# Patient Record
(demographics unavailable — no encounter records)

---

## 2024-10-10 NOTE — HISTORY OF PRESENT ILLNESS
[FreeTextEntry1] : spots [de-identified] : Ms. MADISON HUA is a 59 year old F here for evaluation of below   #Itchy growths on R hairline and R cheek #Interested in botox, had it in May on forehead thinks it caused droopy eyelid #FBSE.  Spots scattered on body x years. Asymptomatic and unchanged. No alleviating/aggravating factors. Never been treated.   Personal hx of skin cancer: no FHx of skin cancer: no Social Hx: not working Referred by: Dr. LUGO,REFERRED

## 2024-10-10 NOTE — ASSESSMENT
[FreeTextEntry1] : #Inflamed Seborrheic Keratosis x3, R face - Given inflammatory nature of lesions above, will treat with cryosurgery - Patient was verbally consented. Lesions treated with liquid nitrogen f/t/f x2 cycles. Patient tolerated it well. - Side effects include blister formation, hypopigmentation, and scarring - Wound care with vaseline  #Seborrheic keratoses #Solar lentigo #Screening exam for skin cancer - no suspicious lesions on exam today - TBSE performed today - Advised sun protection. Recommended OTC sunscreen products (EltaMD/Neutrogena/La Roche Posay), including SPF30+ with broadband UV protection as well as proper use. Discussed OTC sun protective clothing - Counseled patient to monitor for changes, including mole monitoring and self-skin exams  #Rhytides - DIscussed botox (including risk of heavy lids), fraxel, pixel upper lip. Pt to think about it

## 2024-10-28 NOTE — HISTORY OF PRESENT ILLNESS
[de-identified] : The patient presents for reevaluation of left shoulder tendinitis. She had stopped PT after the last visit. She had a cortisone injection 7/2/24 with good relief until last month. She would like to discuss further imaging.

## 2024-10-28 NOTE — DISCUSSION/SUMMARY
[de-identified] : The patient clinically has tendinitis of the left shoulder.  She did have some relief from physical therapy and a corticosteroid injection.  She did not resume physical therapy as recommended.  She has had an increase in pain.  I have suggested that she could resume physical therapy but she prefers to have further diagnostic testing.  She is referred for an MRI of her left shoulder.  She will return following the MRI.

## 2024-10-28 NOTE — PHYSICAL EXAM
[Rad] : radial 2+ and symmetric bilaterally [Normal] : Alert and in no acute distress [Poor Appearance] : well-appearing [Acute Distress] : not in acute distress [de-identified] : The patient has no respiratory distress. Mood and affect are normal. The patient is alert and oriented to person, place and time. Examination of the cervical spine demonstrates no tenderness, no deformity and no muscle spasm. Cervical spine rotation is 60 to the right, 60 to the left, 75 of extension and 45 of flexion. Neurologic exam of the upper extremities reveals intact sensation to light touch. Motor function is 5 over 5 in all groups. Deep tendon reflexes are 2+ and equal at the biceps, triceps and brachioradialis. Examination of the left shoulder demonstrates no deformity. The skin is intact. There is no erythema. There is tenderness anteriorly. Impingement sign is positive There is no instability. Drop arm test is negative. Empty can test is negative. Liftoff test is negative. Rumford test is negative. She has elevation of 125 degrees, external rotation of 40 and internal rotation to the middle lumbar level.  She has painful motion.  The elbows are stable.  There is no lymphedema.

## 2024-11-13 NOTE — HISTORY OF PRESENT ILLNESS
[FreeTextEntry1] : 59F no PMH, on no medications, here for evaluation of osteoporosis recently diagnosed, based on DEXA 9/2024, showing T score -3.8 in lumbar spin, -3.1 in the total hip. Takes daily calcium and vitamin D. Does daily walking 3-5 miles (weight bearing exercise) Did report early menopause, at around age 45.  No alcohol or tobacco use.  No hx of PPI use.  No weight loss in the last few years.  No known family history of hip fractures in parents, no known hx of osteoporosis.     [Weight Loss] : no weight loss [Arthralgias] : no arthralgias

## 2024-11-13 NOTE — ASSESSMENT
[FreeTextEntry1] : 59F no PMH, on no medications, here for evaluation of osteoporosis recently diagnosed, based on DEXA 9/2024, showing T score -3.8 in lumbar spin, -3.1 in the total hip.  I gave patient various options for osteoporosis treatment including oral versus IV bisphosphonates, versus subcutaneous Prolia injections, versus subcutaneous teriparatide versus Evenity injections. Possible side effects for all of the above medications were discussed with the patient.  She was advised that given her prior compression fracture history in the spine as well as T-score exceeding -3.0, she is at especially high risk for fracture and that teriparatide or Evenity would be most highly recommended.  Patient at this time would like to think about her options-I have given her information sheets on all the above medications.  She states she wants to do lifestyle measures first before starting medication but I advised her to start her on a prescription medication due to her degree of osteoporosis.  She will call the office and let me know her decision.  She was advised to take calcium and vitamin D daily with food.  She was advised to do weightbearing exercises if possible.  Check CMP, Vitamin D, TSH, PTH at this time.

## 2024-12-12 NOTE — CONSULT LETTER
[Dear  ___] : Dear  [unfilled], [Consult Letter:] : I had the pleasure of evaluating your patient, [unfilled]. [Please see my note below.] : Please see my note below. [Consult Closing:] : Thank you very much for allowing me to participate in the care of this patient.  If you have any questions, please do not hesitate to contact me. [Sincerely,] : Sincerely, [FreeTextEntry3] : Ramakrishna Peralta, III, MD

## 2024-12-12 NOTE — ADDENDUM
[FreeTextEntry1] : This note was written by Rosanna Moyer on 12/12/2024 acting as scribe for Ramakrishna Pearlta III, MD

## 2024-12-12 NOTE — PHYSICAL EXAM
[de-identified] : Right Hip: Hip: Range of Motion in Degrees: 	                                  Claimant:	Normal:	 Flexion (Active) 	                   120 	                120-degrees	 Flexion (Passive)	                   120	                120-degrees	 Extension (Active)	                   -30	                -30-degrees	 Extension (Passive)	   -30	                -30-degrees	 Abduction (Active)	                   45-50	                74-65-lpwnwyy	 Abduction (Passive)	   45-50	                30-72-lwplsor	 Adduction (Active)              	   20-30	                89-92-esutdme	 Adduction (Passive)	   20-30	                50-30-ynfgbcc	 Internal Rotation (Active) 	   35	                35-degrees	 Internal Rotation (Passive)	   35	                35-degrees	 External Rotation (Active)	   45	                45-degrees	 External Rotation (Passive)	   45	                45-degrees	  Tenderness with flexion, adduction and internal rotation.  No tenderness to axial load. Tenderness into the groin.  No tenderness to palpation over the greater trochanter.  Negative Trendelenburg.  No tenderness with resisted abduction.  No weakness to flexion, extension, abduction or adduction.  No evidence of instability.  No motor or sensory deficits.  2+ DP and PT pulses.  Skin is intact.  No scars, rashes or lesions.     Left Hip: Hip: Range of Motion in Degrees: 	                                 Claimant:	   Normal:	 Flexion (Active) 	                 120 	   120-degrees	 Flexion (Passive)	                 120	   120-degrees	 Extension (Active)	                 -30	   -30-degrees	 Extension (Passive)	 -30	   -30-degrees	 Abduction (Active)	                 45-50	   31-77-hcfzovx	 Abduction (Passive)	 45-50	   58-56-esouaoj	 Adduction (Active)  	 20-30	   58-96-vxqfxap	 Adduction (Passive)	 20-30	   64-36-vfxwhhx	 Internal Rotation (Active) 	 35	   35-degrees	 Internal Rotation (Passive)	 35	   35-degrees	 External Rotation (Active)	 45	   45-degrees	 External Rotation (Passive)	 45	   45-degrees	  No tenderness with internal or external rotation or axial load.  No tenderness to palpation over the greater trochanter.  Negative Trendelenburg.  No tenderness with resisted abduction.  No weakness to flexion, extension, abduction or adduction.  No evidence of instability.  No motor or sensory deficits.  2+ DP and PT pulses.  Skin is intact.  No scars, rashes or lesions.     [de-identified] : Gait and Station:  Ambulating with a slightly antalgic to antalgic gait.  Station:  Normal.  [de-identified] : Appearance:  Well-developed, well-nourished female in no acute distress.   [de-identified] : Radiographs, two to three views of the right hip with pelvis taken in the office today, show no obvious osseous abnormalities.

## 2024-12-12 NOTE — HISTORY OF PRESENT ILLNESS
[de-identified] : The patient comes in today with complaints of pain to her right hip.  She states she kind of tripped over her son's dog and landed on her hip and developed pain into the groin.  The patient states the onset/injury occurred 12/08/2024.  This injury is not work related or due to an automobile accident.  The patient states the pain is constant.  The patient describes the pain as sharp, achy, throbbing, shooting and stabbing. [6] : a current pain level of 6/10 [de-identified] : walking [de-identified] : rest, Tylenol

## 2024-12-12 NOTE — DISCUSSION/SUMMARY
[de-identified] : The patient presents with a probable traumatic labral tear of the right hip.  At this time, I recommend rest, ice, Medrol Dosepak and reassessment in two weeks.

## 2024-12-30 NOTE — HISTORY OF PRESENT ILLNESS
[___ Month(s) Ago] : [unfilled] month(s) ago [FreeTextEntry1] : 12/2024 followup: she is taking calcium and vitamin D daily. She is doing weight bearing exercises regularly. has R. hip pain, MRI recently done showing labrum tear and gluteus tendinosis- was given a script for PT but has not started that yet- oral steroid pack helped symptoms but then they recurred. no tobacco or alcohol use.

## 2024-12-30 NOTE — ASSESSMENT
[FreeTextEntry1] : 59F no PMH, on no medications, here for evaluation of osteoporosis recently diagnosed, based on DEXA 9/2024, showing T score -3.8 in lumbar spin, -3.1 in the total hip. She opted to start Prolia injections.   Patient was told she would be informed when Prolia injection is ready at our office (not available at this time). In the meantime, continue daily calcium and vitamin D supplementation and weight bearing exercises. She was advised that prolonged steroid use can contribute to bone thinning (she has only recently done a medrol pack).  Her next bloodwork should be completed within 1 month prior to her second prolia dose- no labs required at this time.

## 2025-01-09 NOTE — ASSESSMENT
[FreeTextEntry1] : 59F no PMH, on no medications, here for evaluation of osteoporosis recently diagnosed, based on DEXA 9/2024, showing T score -3.8 in lumbar spin, -3.1 in the total hip. She opted to start Prolia injections.   First Prolia injection was administered today to patient's left upper arm. She was advised to follow up in 6 months for repeat Prolia injection, and to have labs (CMP, Vitamin D, TSH) repeated the week prior to her visit.  In the meantime, continue daily calcium and vitamin D supplementation and weight bearing exercises. Next DEXA is due in 9/2026.

## 2025-01-09 NOTE — HISTORY OF PRESENT ILLNESS
[___ Week(s) Ago] : [unfilled] week(s) ago [FreeTextEntry1] : 1/2025: here for prolia injection. : she is taking calcium and vitamin D daily. She is doing weight bearing exercises regularly.

## 2025-01-09 NOTE — PROCEDURE
[Today's Date:] : Date: [unfilled] [Soft Tissue Injection] : soft tissue injection was performed [Risks] : risks [Benefits] : benefits [Consent Obtained] : written consent was obtained prior to the procedure and is detailed in the patient's record [Patient] : Prior to the start of the procedure a time out was taken and the identity of the patient was confirmed via name and date of birth with the patient. The correct site and the procedure to be performed were confirmed. The correct side was confirmed if applicable. The availability of the correct equipment was verified [Therapeutic] : therapeutic [#1 Site: ______] : #1 site identified in the [unfilled] [Alcohol] : alcohol [25 gauge 5/8  inch] : A 25 gauge 5/8 inch needle was used [Tolerated Well] : the patient tolerated the procedure well [No Complications] : there were no complications [Instructions Given] : handouts/patient instructions were given to patient [de-identified] : 60 mg Prolia

## 2025-02-13 NOTE — ADDENDUM
[FreeTextEntry1] : This note was written by Rosanna Moyer on 02/13/2025 acting as scribe for Ramakrishna Peralta III, MD

## 2025-02-13 NOTE — PHYSICAL EXAM
[Normal] : Gait: normal [de-identified] : Right Shoulder: Shoulder: Range of Motion in Degrees:                                 Claimant:          Normal:    Abduction (Active)   180   180 degrees    Abduction (Passive)   180   180 degrees    Forward elevation (Active):   180   180 degrees    Forward elevation (Passive):  180   180 degrees    External rotation (Active):   45   45 degrees    External rotation (Passive):   45   45 degrees    Internal rotation (Active):   L-1   L-1    Internal rotation (Passive):   L-1   L-1      No motor weakness to internal rotation, external rotation or abduction in the scapular plane.  Negative crank test.  Negative [place]met[place]'s test.  Negative Speeds test. Negative Yergason's test.  Negative cross arm test.  No tenderness to palpation at the AC joint. Negative Christian sign.  Negative Neer's sign.  Negative apprehension. Negative sulcus sign.  No gross neurological or vascular deficits distally.  Skin is intact.  No rashes, scars or lesions.  2+ radial and ulnar pulses.  No extra-articular swelling or tenderness.   Left Shoulder: Shoulder: Range of Motion in Degrees:                             Claimant:  Normal:  Abduction (Active)                 180 180 degrees  Abduction (Passive) 180 180 degrees  Forward elevation (Active): 180 180 degrees  Forward elevation (Passive): 180 180 degrees  External rotation (Active): 45 45 degrees  External rotation (Passive): 45 45 degrees  Internal rotation (Active): L-1 L-1  Internal rotation (Passive): L-1 L-1   No motor weakness to internal rotation, external rotation or abduction in the scapular plane.  Negative crank test.  Negative [place]met[place]'s test.  Positive Speeds test.  Positive Yergason's test.  Negative cross arm test.  Mildly tender over the AC joint. Positive Christian sign.  Positive Neer's sign. Negative apprehension. Negative sulcus sign.  No gross neurological or vascular deficits distally.  Skin is intact.  No rashes, scars or lesions. 2+ radial and ulnar pulses. No extra-articular swelling or tenderness.    [de-identified] : Station:  Normal. [de-identified] : Appearance:  Well-developed, well-nourished female in no acute distress.   [de-identified] : Review of MRI is consistent with impingement, AC arthritis, partial cuff tearing and biceps tendinitis.  Radiographs, two views of the left shoulder taken in the office today, show mild degenerative changes at the AC joint.

## 2025-02-13 NOTE — DISCUSSION/SUMMARY
[de-identified] : The patient presents with impingement syndrome, AC joint arthritis and biceps tendinitis.  At this time, she was given a cortisone injection.  I recommend ice, elevation and reassessment in 3-4 weeks.

## 2025-02-13 NOTE — HISTORY OF PRESENT ILLNESS
[de-identified] : The patient comes in today with complaints of pain to her left shoulder, going on for the better part of a year.  She had an injection and therapy, but the pain has persisted.  She had an MRI, which is as noted below.

## 2025-02-13 NOTE — PROCEDURE
[de-identified] : Indication: Impingement, AC joint arthritis, biceps tendinitis left shoulder   Consent: At this time, I have recommended an injection to the left shoulder.  The risks and benefits of the procedure were discussed with the patient in detail.  Upon verbal consent of the patient, we proceeded with the injection as noted below.   Description of Procedure: After a sterile prep, the patient underwent a subacromial intra-articular injection of approximately 9 mL of 1% Lidocaine (10 mg/mL) without epinephrine and 1 mL of triamcinolone acetonide (40 mg/mL) into the left shoulder.  The patient tolerated the procedure well.  There were no complications.   :  [employer] Drug Name:  Triamcinolone Acetonide Injectable Suspension USP NCD#:  [id number] Lot#:  [id number] Expiration Date:  08/31/2025

## 2025-02-13 NOTE — PHYSICAL EXAM
[Normal] : Gait: normal [de-identified] : Right Shoulder: Shoulder: Range of Motion in Degrees:                                 Claimant:          Normal:    Abduction (Active)   180   180 degrees    Abduction (Passive)   180   180 degrees    Forward elevation (Active):   180   180 degrees    Forward elevation (Passive):  180   180 degrees    External rotation (Active):   45   45 degrees    External rotation (Passive):   45   45 degrees    Internal rotation (Active):   L-1   L-1    Internal rotation (Passive):   L-1   L-1      No motor weakness to internal rotation, external rotation or abduction in the scapular plane.  Negative crank test.  Negative [place]'s test.  Negative Speeds test. Negative Yergason's test.  Negative cross arm test.  No tenderness to palpation at the AC joint. Negative Christian sign.  Negative Neer's sign.  Negative apprehension. Negative sulcus sign.  No gross neurological or vascular deficits distally.  Skin is intact.  No rashes, scars or lesions.  2+ radial and ulnar pulses.  No extra-articular swelling or tenderness.   Left Shoulder: Shoulder: Range of Motion in Degrees:                             Claimant:  Normal:  Abduction (Active)                 180 180 degrees  Abduction (Passive) 180 180 degrees  Forward elevation (Active): 180 180 degrees  Forward elevation (Passive): 180 180 degrees  External rotation (Active): 45 45 degrees  External rotation (Passive): 45 45 degrees  Internal rotation (Active): L-1 L-1  Internal rotation (Passive): L-1 L-1   No motor weakness to internal rotation, external rotation or abduction in the scapular plane.  Negative crank test.  Negative [place]'s test.  Positive Speeds test.  Positive Yergason's test.  Negative cross arm test.  Mildly tender over the AC joint. Positive Christian sign.  Positive Neer's sign. Negative apprehension. Negative sulcus sign.  No gross neurological or vascular deficits distally.  Skin is intact.  No rashes, scars or lesions. 2+ radial and ulnar pulses. No extra-articular swelling or tenderness.    [de-identified] : Station:  Normal. [de-identified] : Appearance:  Well-developed, well-nourished female in no acute distress.   [de-identified] : Review of MRI is consistent with impingement, AC arthritis, partial cuff tearing and biceps tendinitis.  Radiographs, two views of the left shoulder taken in the office today, show mild degenerative changes at the AC joint.

## 2025-02-13 NOTE — PROCEDURE
[de-identified] : Indication: Impingement, AC joint arthritis, biceps tendinitis left shoulder   Consent: At this time, I have recommended an injection to the left shoulder.  The risks and benefits of the procedure were discussed with the patient in detail.  Upon verbal consent of the patient, we proceeded with the injection as noted below.   Description of Procedure: After a sterile prep, the patient underwent a subacromial intra-articular injection of approximately 9 mL of 1% Lidocaine (10 mg/mL) without epinephrine and 1 mL of triamcinolone acetonide (40 mg/mL) into the left shoulder.  The patient tolerated the procedure well.  There were no complications.   :  [employer] Drug Name:  Triamcinolone Acetonide Injectable Suspension USP NCD#:  [id number] Lot#:  [id number] Expiration Date:  08/31/2025

## 2025-02-13 NOTE — HISTORY OF PRESENT ILLNESS
[de-identified] : The patient comes in today with complaints of pain to her left shoulder, going on for the better part of a year.  She had an injection and therapy, but the pain has persisted.  She had an MRI, which is as noted below.

## 2025-02-13 NOTE — DISCUSSION/SUMMARY
[de-identified] : The patient presents with impingement syndrome, AC joint arthritis and biceps tendinitis.  At this time, she was given a cortisone injection.  I recommend ice, elevation and reassessment in 3-4 weeks.

## 2025-07-11 NOTE — REASON FOR VISIT
[Follow-Up: _____] : a [unfilled] follow-up visit [FreeTextEntry1] : osteoporosis  Rinvoq Counseling: I discussed with the patient the risks of Rinvoq therapy including but not limited to upper respiratory tract infections, shingles, cold sores, bronchitis, nausea, cough, fever, acne, and headache. Live vaccines should be avoided.  This medication has been linked to serious infections; higher rate of mortality; malignancy and lymphoproliferative disorders; major adverse cardiovascular events; thrombosis; thrombocytopenia, anemia, and neutropenia; lipid elevations; liver enzyme elevations; and gastrointestinal perforations.

## 2025-07-11 NOTE — PROCEDURE
[Today's Date:] : Date: [unfilled] [Soft Tissue Injection] : soft tissue injection was performed [Risks] : risks [Benefits] : benefits [Consent Obtained] : written consent was obtained prior to the procedure and is detailed in the patient's record [Patient] : Prior to the start of the procedure a time out was taken and the identity of the patient was confirmed via name and date of birth with the patient. The correct site and the procedure to be performed were confirmed. The correct side was confirmed if applicable. The availability of the correct equipment was verified [Therapeutic] : therapeutic [#1 Site: ______] : #1 site identified in the [unfilled] [Alcohol] : alcohol [25 gauge 5/8  inch] : A 25 gauge 5/8 inch needle was used [Tolerated Well] : the patient tolerated the procedure well [No Complications] : there were no complications [Instructions Given] : handouts/patient instructions were given to patient [de-identified] : 60 mg Prolia

## 2025-07-11 NOTE — PHYSICAL EXAM
[General Appearance - Alert] : alert [General Appearance - In No Acute Distress] : in no acute distress [General Appearance - Well Developed] : well developed [Sclera] : the sclera and conjunctiva were normal [Extraocular Movements] : extraocular movements were intact [Outer Ear] : the ears and nose were normal in appearance [Neck Appearance] : the appearance of the neck was normal [Exaggerated Use Of Accessory Muscles For Inspiration] : no accessory muscle use [Edema] : there was no peripheral edema [Musculoskeletal - Swelling] : no joint swelling seen [Skin Color & Pigmentation] : normal skin color and pigmentation [] : no rash [Skin Lesions] : no skin lesions [No Focal Deficits] : no focal deficits [Oriented To Time, Place, And Person] : oriented to person, place, and time [Affect] : the affect was normal [Mood] : the mood was normal

## 2025-07-11 NOTE — HISTORY OF PRESENT ILLNESS
[address] [address]      PATIENT NAME:  ADRIANNA LIRIANO   YOB: 1984   MRN:  [id number]   ATTENDING PHYSICIAN:  Giovanny Ace M.D.   ROOM:  [id number]   DICTATING PHYSICIAN:  Giovanny Ace M.D.   UNIT#/ACCOUNT#:  [id number]   DATE OF ADMISSION:  2017            HISTORY OF PRESENT ILLNESS:  This is a 33-year-old white female,  1,   para 0, who presents at 39+ weeks gestation by last menstrual period and first-   trimester ultrasound for a primary lower segment transverse  section.   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  She was   told that if she had a vaginal delivery and pushed, she would likely go blind.   She went to see an ophthalmologist in the [place] area, who did not find the   same finding and suggested that a vaginal delivery was totally appropriate.   The patient then went and had an [facility] consultation, who basically said if she   wants a , give her a ; hence she is here for a .      PAST MEDICAL HISTORY:  Unremarkable.      PAST SURGICAL HISTORY:  None.      MEDICATIONS:  None.      ALLERGIES: ATROPINE, GARLIC AND ONIONS.         SOCIAL HISTORY:  No smoke, drink, or drugs.      GYN HISTORY:  Unremarkable.  No history of STDs.      FAMILY HISTORY:  Noncontributory.      OBSTETRICAL AND ANTEPARTUM HISTORY:  This pregnancy is essentially   unremarkable.      PHYSICAL EXAMINATION:  VITAL SIGNS:  Afebrile.  Vital signs are stable.   HEENT:  Unremarkable.  LUNGS:  Clear.  HEART:  Positive S1, S2.  No S3 or S4.   Regular rate and rhythm.  No murmur.  LUNGS:  Clear.  ABDOMEN:  Soft and   nontender.  No rebound or guarding.  LOWER EXTREMITIES:  No edema.  Negative   Homans sign.      Fetal heart tones are 140.  Positive accelerations, no decelerations.  Good    variability.  No uterine contractions.      ASSESSMENT:  Term intrauterine pregnancy, requesting a primary lower segment   transverse  section secondary to possible ophthalmological   abnormality.  The patient was made aware of all risks of surgery including,   but not limited to death, injury to bowel, bladder, possible nephrostomy,   colostomy, exploratory laparotomy, blood replacement and hysterectomy.  The   patient states she understands all these risks and wishes to proceed.         Giovanny Ace M.D.      Author ID:  [id number]   [place] / MICHAELN: [id number] / Job#: [id number]   D: 2017 18:17:32   T: 2017 01:10:57         [___ Month(s) Ago] : [unfilled] month(s) ago [FreeTextEntry1] : 7/2025 followup:  here for prolia injection. : she is taking calcium and vitamin D daily. She is doing weight bearing exercises regularly. No falls or fractures. About 4-5 lb weight loss, through diet/exercise.

## 2025-07-11 NOTE — ASSESSMENT
[FreeTextEntry1] : 59F no PMH, on no medications, here for evaluation of osteoporosis recently diagnosed, based on DEXA 9/2024, showing T score -3.8 in lumbar spin, -3.1 in the total hip. She started Prolia injections in 1/2025, here for her second injection today.   Plan: - prolia injection given today (2nd dose) - she was advised to continue calcium and vitamin D daily supplementation, and weightbearing exercise (she does walk regularly).  - follow up in 6 months; advised to do labs (CMP, Vitamin D, TSH) in advance of appointment (1/2026)